# Patient Record
Sex: MALE | Race: WHITE | NOT HISPANIC OR LATINO | Employment: FULL TIME | URBAN - METROPOLITAN AREA
[De-identification: names, ages, dates, MRNs, and addresses within clinical notes are randomized per-mention and may not be internally consistent; named-entity substitution may affect disease eponyms.]

---

## 2022-12-21 ENCOUNTER — OFFICE VISIT (OUTPATIENT)
Dept: URGENT CARE | Facility: PHYSICIAN GROUP | Age: 42
End: 2022-12-21
Payer: COMMERCIAL

## 2022-12-21 VITALS
HEART RATE: 57 BPM | DIASTOLIC BLOOD PRESSURE: 62 MMHG | TEMPERATURE: 97.2 F | BODY MASS INDEX: 26.41 KG/M2 | HEIGHT: 72 IN | RESPIRATION RATE: 14 BRPM | WEIGHT: 195 LBS | OXYGEN SATURATION: 98 % | SYSTOLIC BLOOD PRESSURE: 100 MMHG

## 2022-12-21 DIAGNOSIS — L30.9 ECZEMA, UNSPECIFIED TYPE: ICD-10-CM

## 2022-12-21 PROCEDURE — 99203 OFFICE O/P NEW LOW 30 MIN: CPT | Performed by: FAMILY MEDICINE

## 2022-12-21 NOTE — PROGRESS NOTES
Subjective:      42 y.o. male presents to urgent care for rash on his chest that he first noticed on the weekend.  No inciting event or trauma, he was in the shower and noticed it was more red.  There is an associated itch, but no pain.  He does shave his chest, but has not done so for approximately 1 week.  He has tried some regular moisturizing lotion with some relief in symptoms.  No other cold symptoms such as cough, sore throat, or fevers.    He denies any other questions or concerns at this time.    Current problem list, medication, and past medical/surgical history were reviewed in Epic.    ROS  See HPI     Objective:      /62 (BP Location: Left arm, Patient Position: Sitting, BP Cuff Size: Adult)   Pulse (!) 57   Temp 36.2 °C (97.2 °F) (Temporal)   Resp 14   Ht 1.829 m (6')   Wt 88.5 kg (195 lb)   SpO2 98%   BMI 26.45 kg/m²     Physical Exam  Constitutional:       General: He is not in acute distress.     Appearance: He is not diaphoretic.   Cardiovascular:      Rate and Rhythm: Normal rate and regular rhythm.      Heart sounds: Normal heart sounds.   Pulmonary:      Effort: Pulmonary effort is normal. No respiratory distress.      Breath sounds: Normal breath sounds.   Skin:     Findings: Rash (to right upper chest.  Erythematous with small blisters) present.   Neurological:      Mental Status: He is alert.   Psychiatric:         Mood and Affect: Affect normal.         Judgment: Judgment normal.     Assessment/Plan:     1. Eczema, unspecified type  Most consistent with dry skin and eczema.  He was encouraged to use CeraVe moisturizing lotion 2 times daily.  Avoid irritants such as chemicals and hot water      Instructed to return to Urgent Care or nearest Emergency Department if symptoms fail to improve, for any change in condition, further concerns, or new concerning symptoms. Patient states understanding of the plan of care and discharge instructions.    Pat Blount M.D.

## 2024-09-05 ENCOUNTER — OFFICE VISIT (OUTPATIENT)
Dept: DERMATOLOGY | Facility: IMAGING CENTER | Age: 44
End: 2024-09-05
Payer: COMMERCIAL

## 2024-09-05 DIAGNOSIS — D18.01 CHERRY ANGIOMA: ICD-10-CM

## 2024-09-05 DIAGNOSIS — Z12.83 SKIN CANCER SCREENING: ICD-10-CM

## 2024-09-05 DIAGNOSIS — D22.39 FIBROUS PAPULE OF NOSE: ICD-10-CM

## 2024-09-05 DIAGNOSIS — L81.4 LENTIGO: ICD-10-CM

## 2024-09-05 PROCEDURE — 99203 OFFICE O/P NEW LOW 30 MIN: CPT | Performed by: STUDENT IN AN ORGANIZED HEALTH CARE EDUCATION/TRAINING PROGRAM

## 2024-09-05 NOTE — PROGRESS NOTES
[facility] DERMATOLOGY CLINIC NOTE      HPI:  Darrius Gage is a 44 y.o. male who presents today for evaluation:   Chief Complaint   Patient presents with    Establish Care     SHEILA        Requests full skin check, no concerning lesions today. Has bump on nose that has been present for many years, not changing/asymptomatic.    No other symptomatic (itching, painful, burning) or changing lesions.       Dermatology History:     - Family history of skin cancer: none     - Prior history of skin cancer: none        Review of Systems: No fevers, chill. Pertinent positives and negatives above.       Medications, Medical History, Surgical History, Family History & Allergies:  Reviewed in the chart, relevant history noted above.         PHYSICAL EXAM, ASSESSMENT, & PLAN (per problem):   A total body skin exam was performed excluding the genitals per patient preference and including the following areas: head (including face), neck, chest, abdomen, groin/buttocks, back, bilateral upper extremities, and bilateral lower extremities with the following pertinent findings listed below and/or in assessment/plan..       Fibrous papule of the nose (angiofibroma)  Exam: 1-2mm skin colored papule on right nasal dorsum, no vessels on dermoscopy.   - These commonly appear on nose and are benign growths.   - If spot starts to grow, become painful, bleed often, or other concerning changes, please call clinic for re-evaluation.     Lentigo, bilateral shoulders back,   Exam: light brown macules scattered on dorsal forearms  - benign, reassurance     Cherry Angiomas   Exam: scattered 1-3mm bright red macules and thin papules on trunk    - benign, reassurance     Skin Cancer Prevention Counseling   - advise regular sun protection/sunscreen use, SPF 30 or greater with broad spectrum coverage need for reapplication every  minutes.   - recommend broad brimmed hats, UPF sun protective clothing when outdoors for extended periods of time   - recommend  self checks at home,  ABCDEs of melanoma discussed   - handouts provided at visit         Follow up:  tomasz Benjamin MD   [facility]